# Patient Record
Sex: MALE | Race: WHITE | NOT HISPANIC OR LATINO | URBAN - NONMETROPOLITAN AREA
[De-identification: names, ages, dates, MRNs, and addresses within clinical notes are randomized per-mention and may not be internally consistent; named-entity substitution may affect disease eponyms.]

---

## 2018-11-13 PROBLEM — H52.03: Noted: 2018-11-13

## 2018-11-13 PROBLEM — H52.4: Noted: 2018-11-13

## 2018-11-13 PROBLEM — H40.053: Noted: 2018-11-13

## 2018-11-13 PROBLEM — H52.223: Noted: 2018-11-13

## 2019-11-21 ENCOUNTER — IMPORTED ENCOUNTER (OUTPATIENT)
Dept: URBAN - NONMETROPOLITAN AREA CLINIC 1 | Facility: CLINIC | Age: 64
End: 2019-11-21

## 2019-11-21 PROCEDURE — 92014 COMPRE OPH EXAM EST PT 1/>: CPT

## 2019-11-21 PROCEDURE — 92015 DETERMINE REFRACTIVE STATE: CPT

## 2019-11-21 NOTE — PATIENT DISCUSSION
Discuused importance of regular eye exams. BF rx given.; 's Notes: Medical Assist. on surgical floor at [facility].

## 2021-03-16 ENCOUNTER — IMPORTED ENCOUNTER (OUTPATIENT)
Dept: URBAN - NONMETROPOLITAN AREA CLINIC 1 | Facility: CLINIC | Age: 66
End: 2021-03-16

## 2021-03-16 PROCEDURE — 92014 COMPRE OPH EXAM EST PT 1/>: CPT

## 2021-03-16 PROCEDURE — 92015 DETERMINE REFRACTIVE STATE: CPT

## 2021-03-16 NOTE — PATIENT DISCUSSION
Glaucoma Suspect / oc htn-Based on iop-Appears stable at this time.-Continue to monitor with exams and testing. Hyperopia-Discussed diagnosis with patient. Astigmatism-Discussed diagnosis with patient. Presbyopia-Discussed diagnosis with patient. Updated spec Rx given. Recommend lens that will provide comfort as well as protect safety and health of eyes.; 's Notes: Medical Assist. on surgical floor at [facility].

## 2022-04-10 ASSESSMENT — VISUAL ACUITY
OD_CC: 20/30
OS_CC: 20/30
OD_SC: 20/40-2
OU_SC: 20/40-1
OS_CC: 20/30
OD_CC: 20/30
OS_SC: 20/40-1
OU_CC: J1

## 2022-04-10 ASSESSMENT — TONOMETRY
OD_IOP_MMHG: 27
OS_IOP_MMHG: 19
OD_IOP_MMHG: 22
OS_IOP_MMHG: 22
OS_IOP_MMHG: 23
OD_IOP_MMHG: 22

## 2023-01-09 ENCOUNTER — ESTABLISHED PATIENT (OUTPATIENT)
Dept: RURAL CLINIC 1 | Facility: CLINIC | Age: 68
End: 2023-01-09

## 2023-01-09 DIAGNOSIS — H52.03: ICD-10-CM

## 2023-01-09 DIAGNOSIS — H16.223: ICD-10-CM

## 2023-01-09 DIAGNOSIS — H40.053: ICD-10-CM

## 2023-01-09 PROCEDURE — 92083 EXTENDED VISUAL FIELD XM: CPT

## 2023-01-09 PROCEDURE — 92015 DETERMINE REFRACTIVE STATE: CPT

## 2023-01-09 PROCEDURE — 92014 COMPRE OPH EXAM EST PT 1/>: CPT

## 2023-01-09 ASSESSMENT — VISUAL ACUITY
OD_CC: 20/25
OU_CC: 20/20-1
OS_CC: 20/25-1
OD_CC: 20/30
OU_CC: 20/30
OS_CC: 20/30

## 2023-01-09 ASSESSMENT — TONOMETRY
OS_IOP_MMHG: 18
OD_IOP_MMHG: 18

## 2023-01-09 NOTE — PATIENT DISCUSSION
Glaucoma Suspect / oc htn-Based on iop-Appears stable at this time.-Continue to monitor with exams and testing. Hyperopia-Discussed diagnosis with patient. Astigmatism-Discussed diagnosis with patient. Presbyopia-Discussed diagnosis with patient. Updated spec Rx given. Recommend lens that will provide comfort as well as protect safety and health of eyes.; 's Notes: Medical Assist. on surgical floor at [facility].

## 2023-04-14 ENCOUNTER — EMERGENCY VISIT (OUTPATIENT)
Dept: RURAL CLINIC 1 | Facility: CLINIC | Age: 68
End: 2023-04-14

## 2023-04-14 DIAGNOSIS — H52.03: ICD-10-CM

## 2023-04-14 DIAGNOSIS — H16.223: ICD-10-CM

## 2023-04-14 DIAGNOSIS — H25.042: ICD-10-CM

## 2023-04-14 DIAGNOSIS — H40.053: ICD-10-CM

## 2023-04-14 DIAGNOSIS — H25.13: ICD-10-CM

## 2023-04-14 PROCEDURE — 99214 OFFICE O/P EST MOD 30 MIN: CPT

## 2023-04-14 ASSESSMENT — VISUAL ACUITY
OU_SC: 20/20
OD_SC: 20/20-1
OS_SC: 20/70-1

## 2023-04-14 ASSESSMENT — TONOMETRY
OS_IOP_MMHG: 20
OD_IOP_MMHG: 20

## 2023-05-01 ENCOUNTER — CONSULTATION/EVALUATION (OUTPATIENT)
Dept: RURAL CLINIC 1 | Facility: CLINIC | Age: 68
End: 2023-05-01

## 2023-05-01 VITALS
SYSTOLIC BLOOD PRESSURE: 165 MMHG | WEIGHT: 172 LBS | DIASTOLIC BLOOD PRESSURE: 97 MMHG | HEIGHT: 68.5 IN | BODY MASS INDEX: 25.77 KG/M2 | HEART RATE: 74 BPM

## 2023-05-01 DIAGNOSIS — H16.223: ICD-10-CM

## 2023-05-01 DIAGNOSIS — H25.813: ICD-10-CM

## 2023-05-01 DIAGNOSIS — H25.042: ICD-10-CM

## 2023-05-01 DIAGNOSIS — H52.03: ICD-10-CM

## 2023-05-01 DIAGNOSIS — H25.13: ICD-10-CM

## 2023-05-01 PROCEDURE — 92014 COMPRE OPH EXAM EST PT 1/>: CPT

## 2023-05-01 ASSESSMENT — VISUAL ACUITY
OS_SC: 20/70-1
OD_PAM: 20/20
OS_AM: 20/20
OU_SC: 20/30-1
OS_CC: 20/70
OD_CC: 20/25-2
OD_SC: 20/30-1
OU_CC: 20/30
OS_CC: 20/30
OS_PH: 20/50+1
OD_CC: 20/30
OU_CC: 20/25-1
OS_BAT: 20/150
OD_BAT: 20/70

## 2023-05-01 ASSESSMENT — TONOMETRY
OD_IOP_MMHG: 20
OS_IOP_MMHG: 20

## 2023-05-15 ENCOUNTER — PRE-OP/H&P (OUTPATIENT)
Dept: RURAL CLINIC 1 | Facility: CLINIC | Age: 68
End: 2023-05-15

## 2023-05-15 VITALS
WEIGHT: 171 LBS | BODY MASS INDEX: 25.91 KG/M2 | HEART RATE: 92 BPM | SYSTOLIC BLOOD PRESSURE: 135 MMHG | DIASTOLIC BLOOD PRESSURE: 85 MMHG | HEIGHT: 68 IN

## 2023-05-15 DIAGNOSIS — H25.042: ICD-10-CM

## 2023-05-15 DIAGNOSIS — Z01.818: ICD-10-CM

## 2023-05-15 DIAGNOSIS — H25.813: ICD-10-CM

## 2023-05-15 PROCEDURE — 99499 UNLISTED E&M SERVICE: CPT

## 2023-05-23 ENCOUNTER — SURGERY/PROCEDURE (OUTPATIENT)
Dept: URBAN - METROPOLITAN AREA SURGERY 3 | Facility: SURGERY | Age: 68
End: 2023-05-23

## 2023-05-23 DIAGNOSIS — H25.812: ICD-10-CM

## 2023-05-23 PROCEDURE — 66984 XCAPSL CTRC RMVL W/O ECP: CPT

## 2023-05-23 PROCEDURE — 68841 INSJ RX ELUT IMPLT LAC CANAL: CPT

## 2023-05-24 ENCOUNTER — POST-OP (OUTPATIENT)
Dept: RURAL CLINIC 1 | Facility: CLINIC | Age: 68
End: 2023-05-24

## 2023-05-24 DIAGNOSIS — Z96.1: ICD-10-CM

## 2023-05-24 PROCEDURE — 99024 POSTOP FOLLOW-UP VISIT: CPT

## 2023-05-24 ASSESSMENT — TONOMETRY: OS_IOP_MMHG: 18

## 2023-05-24 ASSESSMENT — VISUAL ACUITY: OS_SC: 20/20-2

## 2023-06-06 ENCOUNTER — POST OP/EVAL OF SECOND EYE (OUTPATIENT)
Dept: RURAL CLINIC 1 | Facility: CLINIC | Age: 68
End: 2023-06-06

## 2023-06-06 DIAGNOSIS — Z96.1: ICD-10-CM

## 2023-06-06 DIAGNOSIS — H25.811: ICD-10-CM

## 2023-06-06 PROCEDURE — 99213 OFFICE O/P EST LOW 20 MIN: CPT

## 2023-06-06 ASSESSMENT — VISUAL ACUITY
OU_SC: 20/30
OS_SC: 20/30
OD_SC: 20/30
OS_SC: 20/30
OD_SC: 20/30
OU_SC: 20/25+1

## 2023-06-06 ASSESSMENT — TONOMETRY
OS_IOP_MMHG: 15
OD_IOP_MMHG: 15

## 2023-06-13 ENCOUNTER — SURGERY/PROCEDURE (OUTPATIENT)
Dept: URBAN - METROPOLITAN AREA SURGERY 3 | Facility: SURGERY | Age: 68
End: 2023-06-13

## 2023-06-13 DIAGNOSIS — H25.811: ICD-10-CM

## 2023-06-13 PROCEDURE — 66984 XCAPSL CTRC RMVL W/O ECP: CPT

## 2023-06-13 PROCEDURE — 68841 INSJ RX ELUT IMPLT LAC CANAL: CPT

## 2023-06-14 ENCOUNTER — POST-OP (OUTPATIENT)
Dept: RURAL CLINIC 1 | Facility: CLINIC | Age: 68
End: 2023-06-14

## 2023-06-14 DIAGNOSIS — Z96.1: ICD-10-CM

## 2023-06-14 PROCEDURE — 99024 POSTOP FOLLOW-UP VISIT: CPT

## 2023-06-14 ASSESSMENT — TONOMETRY: OD_IOP_MMHG: 20

## 2023-06-14 ASSESSMENT — VISUAL ACUITY
OS_SC: 20/30-1
OD_SC: 20/25+1
OU_SC: 20/25-1

## 2023-06-20 ENCOUNTER — POST-OP (OUTPATIENT)
Dept: RURAL CLINIC 1 | Facility: CLINIC | Age: 68
End: 2023-06-20

## 2023-06-20 DIAGNOSIS — Z96.1: ICD-10-CM

## 2023-06-20 PROCEDURE — 99024 POSTOP FOLLOW-UP VISIT: CPT

## 2023-06-20 ASSESSMENT — VISUAL ACUITY
OS_SC: 20/30
OU_SC: 20/30
OD_SC: 20/30
OU_SC: 20/20-1
OD_SC: 20/20-1
OS_SC: 20/20-1

## 2023-06-20 ASSESSMENT — TONOMETRY
OS_IOP_MMHG: 22
OD_IOP_MMHG: 24

## 2024-06-25 ENCOUNTER — COMPREHENSIVE EXAM (OUTPATIENT)
Dept: RURAL CLINIC 1 | Facility: CLINIC | Age: 69
End: 2024-06-25

## 2024-06-25 DIAGNOSIS — H16.223: ICD-10-CM

## 2024-06-25 DIAGNOSIS — H40.013: ICD-10-CM

## 2024-06-25 DIAGNOSIS — Z96.1: ICD-10-CM

## 2024-06-25 PROCEDURE — 92014 COMPRE OPH EXAM EST PT 1/>: CPT

## 2024-06-25 ASSESSMENT — TONOMETRY
OS_IOP_MMHG: 19
OD_IOP_MMHG: 19

## 2024-06-25 ASSESSMENT — VISUAL ACUITY
OS_SC: 20/20
OD_SC: 20/20
OU_SC: 20/20